# Patient Record
Sex: FEMALE | URBAN - METROPOLITAN AREA
[De-identification: names, ages, dates, MRNs, and addresses within clinical notes are randomized per-mention and may not be internally consistent; named-entity substitution may affect disease eponyms.]

---

## 2020-04-16 ENCOUNTER — OFFICE (OUTPATIENT)
Dept: URBAN - METROPOLITAN AREA CLINIC 34 | Facility: CLINIC | Age: 65
End: 2020-04-16

## 2020-04-16 DIAGNOSIS — R74.8 ABNORMAL LEVELS OF OTHER SERUM ENZYMES: ICD-10-CM

## 2020-04-16 DIAGNOSIS — E11.9 TYPE 2 DIABETES MELLITUS WITHOUT COMPLICATIONS: ICD-10-CM

## 2020-04-16 PROCEDURE — 99205 OFFICE O/P NEW HI 60 MIN: CPT | Mod: GQ | Performed by: PHYSICIAN ASSISTANT

## 2020-04-16 PROCEDURE — 00002: CPT | Performed by: PHYSICIAN ASSISTANT

## 2020-04-16 NOTE — INTERFACERESULTNOTES
There is an elevated marker which may indicate celiac disease. Recommend upper endoscopy to further assess for this (would go ahead and get scheduled - EGD with duodenal biopsies). Missing TINEO Fibrosure test. Schedule f/u visit to go over all results.

## 2020-04-16 NOTE — SERVICENOTES
I have reviewed the history, physical exam, assessment and management plans.  I concur with or have edited all elements of her note.

Patient's visit was conducted through zoom telecommunication. Patient consented before the start of visit as to understanding of privacy concerns, possible technological failure, and their responsibility of carrying out instructions of plan.

## 2020-04-16 NOTE — SERVICEHPINOTES
PATIENT VERIFIED BY DATE OF BIRTH AND NAME. Patient has been consented for this telecommunication visit. BR  EUNICE CRUZ   is a   65   year old    female who is being seen in consultation at the request of   ISABELLA CANTU   for elevated liver enzymes. She reports she had routine labs with her PCP and was advised to f/u with us on her liver. Liver enzymes were elevated and she had a f/u abdominal U/S that showed subtle liver nodularity concerning for early cirrhotic change. Mild splenomegaly was also noted. She denies any h/o liver disease Hep B &amp C testing was negative. She denies ETOH intake or herbal meds other than turmeric.Patient has a fairly extensive medical history. She follows with urology for urinary incontinence and also sees a wound specialist for pressure ulcer. Diabetes is managed by PCP. She reports her last HgbA1C was around 7. She reports h/o acute renal failure in 2019 but reports return of normal function. She reports h/o sepsis in 2016, was in medically-induced coma for nearly 6 weeks. She had a stroke during that time and has some residual right-sided paresis. Uses walker and wheelchair at times. Stress levels are high and she reports stress-eating since last November.3/5/20 HBsAg neg, Hep C ab neg, Vitamin D 33.8, , , HDL 51, creat 0.94, AST/ALT 59/37, alk phos 248, HgbA1C 6.9

## 2020-05-14 LAB
ALPHA-1-ANTITRYPSIN, SERUM: 204 MG/DL — HIGH (ref 101–187)
AMBIG ABBREV CMP14 DEFAULT: (no result)
ANTI-SMOOTH MUSCLE/MITOCHOND.: ACTIN (SMOOTH MUSCLE) ANTIBODY: 9 UNITS (ref 0–19)
ANTI-SMOOTH MUSCLE/MITOCHOND.: MITOCHONDRIAL (M2) ANTIBODY: <20 UNITS
ANTINUCLEAR ANTIBODIES, IFA: NEGATIVE
CBC/DIFF AMBIGUOUS DEFAULT: BASO (ABSOLUTE): 0 X10E3/UL (ref 0–0.2)
CBC/DIFF AMBIGUOUS DEFAULT: BASOS: 1 %
CBC/DIFF AMBIGUOUS DEFAULT: EOS (ABSOLUTE): 0.2 X10E3/UL (ref 0–0.4)
CBC/DIFF AMBIGUOUS DEFAULT: EOS: 2 %
CBC/DIFF AMBIGUOUS DEFAULT: HEMATOCRIT: 40.5 % (ref 34–46.6)
CBC/DIFF AMBIGUOUS DEFAULT: HEMOGLOBIN: 13.4 G/DL (ref 11.1–15.9)
CBC/DIFF AMBIGUOUS DEFAULT: IMMATURE GRANS (ABS): 0 X10E3/UL (ref 0–0.1)
CBC/DIFF AMBIGUOUS DEFAULT: IMMATURE GRANULOCYTES: 0 %
CBC/DIFF AMBIGUOUS DEFAULT: LYMPHS (ABSOLUTE): 1.1 X10E3/UL (ref 0.7–3.1)
CBC/DIFF AMBIGUOUS DEFAULT: LYMPHS: 14 %
CBC/DIFF AMBIGUOUS DEFAULT: MCH: 27 PG (ref 26.6–33)
CBC/DIFF AMBIGUOUS DEFAULT: MCHC: 33.1 G/DL (ref 31.5–35.7)
CBC/DIFF AMBIGUOUS DEFAULT: MCV: 82 FL (ref 79–97)
CBC/DIFF AMBIGUOUS DEFAULT: MONOCYTES(ABSOLUTE): 0.5 X10E3/UL (ref 0.1–0.9)
CBC/DIFF AMBIGUOUS DEFAULT: MONOCYTES: 7 %
CBC/DIFF AMBIGUOUS DEFAULT: NEUTROPHILS (ABSOLUTE): 6.3 X10E3/UL (ref 1.4–7)
CBC/DIFF AMBIGUOUS DEFAULT: NEUTROPHILS: 76 %
CBC/DIFF AMBIGUOUS DEFAULT: PLATELETS: 325 X10E3/UL (ref 150–450)
CBC/DIFF AMBIGUOUS DEFAULT: RBC: 4.96 X10E6/UL (ref 3.77–5.28)
CBC/DIFF AMBIGUOUS DEFAULT: RDW: 13.2 % (ref 11.7–15.4)
CBC/DIFF AMBIGUOUS DEFAULT: WBC: 8.2 X10E3/UL (ref 3.4–10.8)
CELIAC DISEASE COMPREHENSIVE: DEAMIDATED GLIADIN ABS, IGA: 28 UNITS — HIGH (ref 0–19)
CELIAC DISEASE COMPREHENSIVE: DEAMIDATED GLIADIN ABS, IGG: 9 UNITS (ref 0–19)
CELIAC DISEASE COMPREHENSIVE: ENDOMYSIAL ANTIBODY IGA: NEGATIVE
CELIAC DISEASE COMPREHENSIVE: IMMUNOGLOBULIN A, QN, SERUM: 623 MG/DL — HIGH (ref 87–352)
CELIAC DISEASE COMPREHENSIVE: T-TRANSGLUTAMINASE (TTG) IGA: <2 U/ML
CELIAC DISEASE COMPREHENSIVE: T-TRANSGLUTAMINASE (TTG) IGG: 6 U/ML — HIGH (ref 0–5)
CERULOPLASMIN: 31.5 MG/DL (ref 19–39)
COMP. METABOLIC PANEL (14): A/G RATIO: 1.1 — LOW (ref 1.2–2.2)
COMP. METABOLIC PANEL (14): ALBUMIN: 4 G/DL (ref 3.8–4.8)
COMP. METABOLIC PANEL (14): ALKALINE PHOSPHATASE: 177 IU/L — HIGH (ref 39–117)
COMP. METABOLIC PANEL (14): ALT (SGPT): 22 IU/L (ref 0–32)
COMP. METABOLIC PANEL (14): AST (SGOT): 30 IU/L (ref 0–40)
COMP. METABOLIC PANEL (14): BILIRUBIN, TOTAL: 0.3 MG/DL (ref 0–1.2)
COMP. METABOLIC PANEL (14): BUN/CREATININE RATIO: 31 — HIGH (ref 12–28)
COMP. METABOLIC PANEL (14): BUN: 29 MG/DL — HIGH (ref 8–27)
COMP. METABOLIC PANEL (14): CALCIUM: 9.7 MG/DL (ref 8.7–10.3)
COMP. METABOLIC PANEL (14): CARBON DIOXIDE, TOTAL: 29 MMOL/L (ref 20–29)
COMP. METABOLIC PANEL (14): CHLORIDE: 98 MMOL/L (ref 96–106)
COMP. METABOLIC PANEL (14): CREATININE: 0.94 MG/DL (ref 0.57–1)
COMP. METABOLIC PANEL (14): EGFR IF AFRICN AM: 75 ML/MIN/1.73 (ref 59–?)
COMP. METABOLIC PANEL (14): EGFR IF NONAFRICN AM: 65 ML/MIN/1.73 (ref 59–?)
COMP. METABOLIC PANEL (14): GLOBULIN, TOTAL: 3.6 G/DL (ref 1.5–4.5)
COMP. METABOLIC PANEL (14): GLUCOSE: 87 MG/DL (ref 65–99)
COMP. METABOLIC PANEL (14): POTASSIUM: 4.9 MMOL/L (ref 3.5–5.2)
COMP. METABOLIC PANEL (14): PROTEIN, TOTAL: 7.6 G/DL (ref 6–8.5)
COMP. METABOLIC PANEL (14): SODIUM: 142 MMOL/L (ref 134–144)
IRON AND TIBC: IRON BIND.CAP.(TIBC): 457 UG/DL — HIGH (ref 250–450)
IRON AND TIBC: IRON SATURATION: 16 % (ref 15–55)
IRON AND TIBC: IRON: 71 UG/DL (ref 27–139)
IRON AND TIBC: UIBC: 386 UG/DL — HIGH (ref 118–369)
PT AND PTT: APTT: 30 SEC (ref 24–33)
PT AND PTT: INR: 1 (ref 0.8–1.2)
PT AND PTT: PROTHROMBIN TIME: 10.7 SEC (ref 9.1–12)

## 2020-05-21 LAB
FERRITIN, SERUM: 24 NG/ML (ref 15–150)
HEP A AB, TOTAL: NEGATIVE
HEPATITIS B SURF AB QUANT: <3.1 MIU/ML — LOW
NASH FIBROSURE: ALPHA 2-MACROGLOBULINS, QN: 184 MG/DL (ref 110–276)
NASH FIBROSURE: ALT (SGPT) P5P: 22 IU/L (ref 0–40)
NASH FIBROSURE: APOLIPOPROTEIN A-1: 179 MG/DL (ref 116–209)
NASH FIBROSURE: AST (SGOT) P5P: 35 IU/L (ref 0–40)
NASH FIBROSURE: BILIRUBIN, TOTAL: <0.1 MG/DL
NASH FIBROSURE: CHOLESTEROL, TOTAL: 167 MG/DL (ref 100–199)
NASH FIBROSURE: COMMENT: (no result)
NASH FIBROSURE: FIBROSIS SCORE: 0.08 (ref 0–0.21)
NASH FIBROSURE: FIBROSIS SCORING: (no result)
NASH FIBROSURE: FIBROSIS STAGE: (no result)
NASH FIBROSURE: GGT: 151 IU/L — HIGH (ref 0–60)
NASH FIBROSURE: GLUCOSE, SERUM: 88 MG/DL (ref 65–99)
NASH FIBROSURE: HAPTOGLOBIN: 196 MG/DL (ref 37–355)
NASH FIBROSURE: HEIGHT: 66 IN
NASH FIBROSURE: INTERPRETATIONS: (no result)
NASH FIBROSURE: LIMITATIONS: (no result)
NASH FIBROSURE: NASH GRADE: (no result)
NASH FIBROSURE: NASH SCORE: 0.25 (ref 0.25–?)
NASH FIBROSURE: NASH SCORING: (no result)
NASH FIBROSURE: STEATOSIS GRADE: (no result)
NASH FIBROSURE: STEATOSIS GRADING: (no result)
NASH FIBROSURE: STEATOSIS SCORE: 0.71 — HIGH (ref 0–0.3)
NASH FIBROSURE: TRIGLYCERIDES: 135 MG/DL (ref 0–149)
NASH FIBROSURE: WEIGHT: 307 LBS
WRITTEN AUTHORIZATION: (no result)

## 2020-06-23 ENCOUNTER — ON CAMPUS - OUTPATIENT (OUTPATIENT)
Dept: URBAN - METROPOLITAN AREA HOSPITAL 16 | Facility: HOSPITAL | Age: 65
End: 2020-06-23

## 2020-06-23 DIAGNOSIS — K22.8 OTHER SPECIFIED DISEASES OF ESOPHAGUS: ICD-10-CM

## 2020-06-23 DIAGNOSIS — R74.8 ABNORMAL LEVELS OF OTHER SERUM ENZYMES: ICD-10-CM

## 2020-06-23 DIAGNOSIS — K29.60 OTHER GASTRITIS WITHOUT BLEEDING: ICD-10-CM

## 2020-06-23 DIAGNOSIS — R79.89 OTHER SPECIFIED ABNORMAL FINDINGS OF BLOOD CHEMISTRY: ICD-10-CM

## 2020-06-23 PROCEDURE — 00002: CPT | Performed by: INTERNAL MEDICINE

## 2020-06-23 PROCEDURE — 43239 EGD BIOPSY SINGLE/MULTIPLE: CPT | Performed by: INTERNAL MEDICINE

## 2020-07-15 ENCOUNTER — OFFICE (OUTPATIENT)
Dept: URBAN - METROPOLITAN AREA TELEHEALTH 7 | Facility: TELEHEALTH | Age: 65
End: 2020-07-15
Payer: COMMERCIAL

## 2020-07-15 VITALS — HEIGHT: 67 IN | WEIGHT: 293 LBS

## 2020-07-15 DIAGNOSIS — K29.00 ACUTE GASTRITIS WITHOUT BLEEDING: ICD-10-CM

## 2020-07-15 DIAGNOSIS — K76.0 FATTY (CHANGE OF) LIVER, NOT ELSEWHERE CLASSIFIED: ICD-10-CM

## 2020-07-15 DIAGNOSIS — E11.9 TYPE 2 DIABETES MELLITUS WITHOUT COMPLICATIONS: ICD-10-CM

## 2020-07-15 PROCEDURE — 99214 OFFICE O/P EST MOD 30 MIN: CPT | Performed by: PHYSICIAN ASSISTANT

## 2020-07-15 RX ORDER — PANTOPRAZOLE SODIUM 20 MG/1
TABLET, DELAYED RELEASE ORAL
Qty: 30 | Refills: 3 | Status: COMPLETED
Start: 2020-07-15 | End: 2023-04-18

## 2020-07-15 NOTE — SERVICEHPINOTES
PATIENT VERIFIED BY DATE OF BIRTH AND NAME. Patient has been consented for this telecommunication visit. 66 yo female with diabetes presents for f/u elevated liver enzymes. Her lab w/u was fairly unremarkable except for some positive celiac markers (mild tTG elevation, gliadin abs pos but NEG endomysial antibody). Her liver enzymes were improved. TINEO Fibrosure showed fatty liver (S3) but no fibrosis (F0). She had an EGD with duodenal biopsies which were negative for celiac. She did have some esophagitis and gastritis - biopsies benign. She can have some stomach burning when she takes her meds on empty stomach so she usually tries to take them with food. She denies GERD symptoms. Prefers to keep additional medication to a minimum. Feeling well overall and has no other concerns today. Prior hx: Her abdominal U/S showed subtle liver nodularity concerning for early cirrhotic change. Mild splenomegaly was also noted. She denies any h/o liver disease Hep B &amp C testing was negative. She denies ETOH intake or herbal meds other than turmeric.Patient has a fairly extensive medical history. She follows with urology for urinary incontinence and also sees a wound specialist for pressure ulcer. Diabetes is managed by PCP. She reports her last HgbA1C was around 7. She reports h/o acute renal failure in 2019 but reports return of normal function. She reports h/o sepsis in 2016, was in medically-induced coma for nearly 6 weeks. She had a stroke during that time and has some residual right-sided paresis. Uses walker and wheelchair at times. Stress levels are high and she reports stress-eating since last November.5/14/20 celiac test POS, plt 325, AST/ALT 30/22, alk phos 177, iron sat 16, INR 1.0, ASMA neg, AMA neg, cerulo and A1AT ok, LUIZA neg, No immunity to Hep A or B (consider vaccine series for these), ferritin 24, TINEO Fibrosure - S3, F0BR3/5/20 HBsAg neg, Hep C ab neg, Vitamin D 33.8, , , HDL 51, creat 0.94, AST/ALT 59/37, alk phos 248, HgbA1C 6.9

## 2020-07-15 NOTE — SERVICENOTES
Patient's visit was conducted through phone communication. Patient consented before the start of visit as to understanding of privacy concerns, possible technological failure, and their responsibility of carrying out instructions of plan.

I have reviewed the history, physical exam, assessment and management plans.  I concur with or have edited all elements of her note.

## 2020-08-20 LAB
AMBIG ABBREV CMP14 DEFAULT: (no result)
COMP. METABOLIC PANEL (14): A/G RATIO: 1.1 — LOW (ref 1.2–2.2)
COMP. METABOLIC PANEL (14): ALBUMIN: 3.8 G/DL (ref 3.8–4.8)
COMP. METABOLIC PANEL (14): ALKALINE PHOSPHATASE: 145 IU/L — HIGH (ref 39–117)
COMP. METABOLIC PANEL (14): ALT (SGPT): 16 IU/L (ref 0–32)
COMP. METABOLIC PANEL (14): AST (SGOT): 23 IU/L (ref 0–40)
COMP. METABOLIC PANEL (14): BILIRUBIN, TOTAL: 0.2 MG/DL (ref 0–1.2)
COMP. METABOLIC PANEL (14): BUN/CREATININE RATIO: 27 (ref 12–28)
COMP. METABOLIC PANEL (14): BUN: 26 MG/DL (ref 8–27)
COMP. METABOLIC PANEL (14): CALCIUM: 9.4 MG/DL (ref 8.7–10.3)
COMP. METABOLIC PANEL (14): CARBON DIOXIDE, TOTAL: 29 MMOL/L (ref 20–29)
COMP. METABOLIC PANEL (14): CHLORIDE: 100 MMOL/L (ref 96–106)
COMP. METABOLIC PANEL (14): CREATININE: 0.97 MG/DL (ref 0.57–1)
COMP. METABOLIC PANEL (14): EGFR IF AFRICN AM: 72 ML/MIN/1.73 (ref 59–?)
COMP. METABOLIC PANEL (14): EGFR IF NONAFRICN AM: 62 ML/MIN/1.73 (ref 59–?)
COMP. METABOLIC PANEL (14): GLOBULIN, TOTAL: 3.5 G/DL (ref 1.5–4.5)
COMP. METABOLIC PANEL (14): GLUCOSE: 188 MG/DL — HIGH (ref 65–99)
COMP. METABOLIC PANEL (14): POTASSIUM: 4.7 MMOL/L (ref 3.5–5.2)
COMP. METABOLIC PANEL (14): PROTEIN, TOTAL: 7.3 G/DL (ref 6–8.5)
COMP. METABOLIC PANEL (14): SODIUM: 143 MMOL/L (ref 134–144)

## 2021-05-18 ENCOUNTER — OFFICE (OUTPATIENT)
Dept: URBAN - METROPOLITAN AREA TELEHEALTH 3 | Facility: TELEHEALTH | Age: 66
End: 2021-05-18
Payer: COMMERCIAL

## 2021-05-18 VITALS — WEIGHT: 293 LBS | HEIGHT: 67 IN

## 2021-05-18 DIAGNOSIS — E11.9 TYPE 2 DIABETES MELLITUS WITHOUT COMPLICATIONS: ICD-10-CM

## 2021-05-18 DIAGNOSIS — K76.0 FATTY (CHANGE OF) LIVER, NOT ELSEWHERE CLASSIFIED: ICD-10-CM

## 2021-05-18 PROCEDURE — 99213 OFFICE O/P EST LOW 20 MIN: CPT | Mod: 95 | Performed by: PHYSICIAN ASSISTANT

## 2021-05-18 NOTE — SERVICEHPINOTES
PATIENT VERIFIED BY DATE OF BIRTH AND NAME. Patient has been consented for this telecommunication visit. 67 yo female with diabetes presents for f/u elevated liver enzymes. Last see in July, at which time her lab w/u was fairly unremarkable except for some positive celiac markers (mild tTG IgG elevation, gliadin abs pos but NEG endomysial antibody). Her liver enzymes were improved. TINEO Fibrosure showed fatty liver (S3) but no fibrosis (F0). TINEO score was 0, suggesting no TINEO. She had an EGD with duodenal biopsies which were negative for celiac. She did have some esophagitis and gastritis - biopsies benign. She has been doing well overall. Has not lost any weight. She had recent updated labs with normal liver enzymes, alk phos elevation similar to past, and negative celiac panel (tTG IgA, endomysial ab). No prior colonoscopy but she is fairly adamant about not wanting this. ROS today is negative.Prior hx: Her abdominal U/S showed subtle liver nodularity concerning for early cirrhotic change. Mild splenomegaly was also noted. She denies any h/o liver disease Hep B &amp C testing was negative. She denies ETOH intake or herbal meds other than turmeric.Patient has a fairly extensive medical history. She follows with urology for urinary incontinence and also sees a wound specialist for pressure ulcer. Diabetes is managed by PCP. She reports her last HgbA1C was around 7. She reports h/o acute renal failure in 2019 but reports return of normal function. She reports h/o sepsis in 2016, was in medically-induced coma for nearly 6 weeks. She had a stroke during that time and has some residual right-sided paresis. Uses walker and wheelchair at times. Stress levels are high and she reports stress-eating since last November.4/13/21 celiac panel negative, AST/ALT 25/20, alk phos 221 BR5/14/20 celiac test POS, plt 325, AST/ALT 30/22, alk phos 177, iron sat 16, INR 1.0, ASMA neg, AMA neg, cerulo and A1AT ok, LIUZA neg, No immunity to Hep A or B (consider vaccine series for these), ferritin 24, TINEO Fibrosure - S3, F0BR3/5/20 HBsAg neg, Hep C ab neg, Vitamin D 33.8, , , HDL 51, creat 0.94, AST/ALT 59/37, alk phos 248, HgbA1C 6.9

## 2023-04-18 ENCOUNTER — OFFICE (OUTPATIENT)
Dept: URBAN - METROPOLITAN AREA TELEHEALTH 7 | Facility: TELEHEALTH | Age: 68
End: 2023-04-18

## 2023-04-18 VITALS — WEIGHT: 250 LBS | HEIGHT: 67 IN

## 2023-04-18 DIAGNOSIS — K63.2 FISTULA OF INTESTINE: ICD-10-CM

## 2023-04-18 DIAGNOSIS — K76.0 FATTY (CHANGE OF) LIVER, NOT ELSEWHERE CLASSIFIED: ICD-10-CM

## 2023-04-18 PROCEDURE — 99214 OFFICE O/P EST MOD 30 MIN: CPT | Mod: 95 | Performed by: PHYSICIAN ASSISTANT

## 2023-04-18 NOTE — SERVICEHPINOTES
PATIENT VERIFIED BY DATE OF BIRTH AND NAME. Patient has been consented for this telecommunication visit.
tere carranza68 yo female presents to discuss a colonoscopy. She reports a h/o infected abdominal mesh in 2009. 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 She has seen a number of specialists, including at [place] and [facility]. She is now seeing a surgeon in [place] who plans to do the surgery there, but was advised to have a colonoscopy first. She was thinking she might be able to have it locally with us, though was also considering doing this at the [facility] where her surgeon is.tere carranza She currently has stool that only comes out of the fistula. Mentions that her surgeon said something about both parts (abdomen and rectum) needing to be evaluated with barium.
tere carranza She has a wound bag available to catch stool contents. 
tere Campbellgiuseppe has had imaging in various places and we have no records on her in regards to these issues.tere carranza She denies h/o heart disease. She has intentionally lost nearly 100 pounds and currently feels fairly well overall.tere carranza She was seen by us about 2 years ago for fatty liver and no evidence of fibrosis at that time.
tere carranza ROS as above, otherwise negative. tere